# Patient Record
Sex: FEMALE | Race: BLACK OR AFRICAN AMERICAN | URBAN - METROPOLITAN AREA
[De-identification: names, ages, dates, MRNs, and addresses within clinical notes are randomized per-mention and may not be internally consistent; named-entity substitution may affect disease eponyms.]

---

## 2017-01-24 ENCOUNTER — OFFICE VISIT (OUTPATIENT)
Dept: URGENT CARE | Facility: URGENT CARE | Age: 57
End: 2017-01-24
Payer: MEDICARE

## 2017-01-24 ENCOUNTER — RADIANT APPOINTMENT (OUTPATIENT)
Dept: GENERAL RADIOLOGY | Facility: CLINIC | Age: 57
End: 2017-01-24
Attending: PHYSICIAN ASSISTANT
Payer: MEDICARE

## 2017-01-24 VITALS
HEART RATE: 74 BPM | DIASTOLIC BLOOD PRESSURE: 86 MMHG | SYSTOLIC BLOOD PRESSURE: 133 MMHG | TEMPERATURE: 98.3 F | WEIGHT: 272 LBS

## 2017-01-24 DIAGNOSIS — R07.81 RIB PAIN ON RIGHT SIDE: ICD-10-CM

## 2017-01-24 DIAGNOSIS — M62.830 BACK MUSCLE SPASM: ICD-10-CM

## 2017-01-24 DIAGNOSIS — M54.50 ACUTE BILATERAL LOW BACK PAIN WITHOUT SCIATICA: ICD-10-CM
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
## 2017-01-24 PROCEDURE — 72100 X-RAY EXAM L-S SPINE 2/3 VWS: CPT

## 2017-01-24 PROCEDURE — 71101 X-RAY EXAM UNILAT RIBS/CHEST: CPT | Mod: RT

## 2017-01-24 PROCEDURE — 99203 OFFICE O/P NEW LOW 30 MIN: CPT | Performed by: PHYSICIAN ASSISTANT

## 2017-01-24 RX ORDER — CYCLOBENZAPRINE HCL 10 MG
10 TABLET ORAL 3 TIMES DAILY PRN
Qty: 30 TABLET | Refills: 0 | Status: SHIPPED | OUTPATIENT
Start: 2017-01-24

## 2017-01-24 RX ORDER — HYDROCODONE BITARTRATE AND ACETAMINOPHEN 5; 325 MG/1; MG/1
1-2 TABLET ORAL EVERY 8 HOURS PRN
Qty: 15 TABLET | Refills: 0 | Status: SHIPPED | OUTPATIENT
Start: 2017-01-24

## 2017-01-24 NOTE — NURSING NOTE
Chief Complaint   Patient presents with     Back Pain     back pain from fall down stairs last night.      Initial /86 mmHg  Pulse 74  Temp(Src) 98.3  F (36.8  C) (Oral)  Wt 272 lb (123.378 kg) There is no height on file to calculate BMI..  bp completed using cuff size large  SAÚL Pizano MA

## 2017-01-24 NOTE — PROGRESS NOTES
SUBJECTIVE  HPI: Katiuska Bedolla is a 56 year old female who presents for evaluation of back pain  Symptoms began 1 day(s) ago, have been onset acute and are worse.  Pain is located in the low back bilateral and middle of back right region, with radiation to none, and are at worst a 8 on a scale of 1-10.  Recent injury:fall/near fall  Personal hx of back pain is worsening.  Pain is exacerbated by: movements.  Pain is relieved by: nothing   Associated sx include: lower extremity numbness bilateral and urinary incontinence ([facility] patient to the ER).  Red flag symptoms: no fever or chills.    No past medical history on file.  Allergies   Allergen Reactions     Nsaids      Social History   Substance Use Topics     Smoking status: Never Smoker      Smokeless tobacco: Never Used     Alcohol Use: Not on file       ROS:  CONSTITUTIONAL:NEGATIVE for fever, chills, change in weight  INTEGUMENTARY/SKIN: NEGATIVE for worrisome rashes, moles or lesions  ENT/MOUTH: NEGATIVE for ear, mouth and throat problems  RESP:NEGATIVE for significant cough or SOB  CV: NEGATIVE for chest pain, palpitations or peripheral edema  GI: NEGATIVE for nausea, abdominal pain, heartburn, or change in bowel habits  MUSCULOSKELETAL: POSITIVE  for lower back pain and right side rib pain, tenderness  NEURO: NEGATIVE for weakness, dizziness or paresthesias    OBJECTIVE:  /86 mmHg  Pulse 74  Temp(Src) 98.3  F (36.8  C) (Oral)  Wt 272 lb (123.378 kg)  Back examination: Back symmetric, no curvature. ROM normal. No CVA tenderness., positive findings: paraspinal muscle spasm, tenderness to palpation right side posterior rib and lower back pain  STRAIGHT leg raise test: negative  GENERAL APPEARANCE: healthy, alert and no distress  RESP: lungs clear to auscultation - no rales, rhonchi or wheezes  CV: regular rates and rhythm, normal S1 S2, no murmur noted  ABDOMEN:  soft, nontender, no HSM or masses and bowel sounds normal  NEURO: Normal strength and tone  with no weakness or sensory deficit noted, reflexes normal   Extremities: no peripheral edema or tenderness, peripheral pulses normal  MS:  Positive for lower back pain, and localized posterior right side rib pain  SKIN: no suspicious lesions or rashes    Lumbar xray Negative for acute findings, read by Brian ORTA at time of visit.    Rib xray Negative for acute findings, read by Brian ORTA at time of visit.    ASSESSMENT/IMPRESSION/PLAN:      ICD-10-CM    1. Fall down steps, initial encounter W10.8XXA    2. Acute bilateral low back pain without sciatica M54.5 XR Lumbar Spine 2/3 Views     HYDROcodone-acetaminophen (NORCO) 5-325 MG per tablet   3. Rib pain on right side R07.81 XR Ribs & Chest Right G/E 3 Views     HYDROcodone-acetaminophen (NORCO) 5-325 MG per tablet   4. Back muscle spasm M62.830 cyclobenzaprine (FLEXERIL) 10 MG tablet       EDUCATION      1.  Continue stretching and strengthening exercises.       2.  Continue prn heat or ice application.    Orders Placed This Encounter     XR Lumbar Spine 2/3 Views     XR Ribs & Chest Right G/E 3 Views     HYDROcodone-acetaminophen (NORCO) 5-325 MG per tablet     cyclobenzaprine (FLEXERIL) 10 MG tablet